# Patient Record
Sex: FEMALE | Race: WHITE | URBAN - METROPOLITAN AREA
[De-identification: names, ages, dates, MRNs, and addresses within clinical notes are randomized per-mention and may not be internally consistent; named-entity substitution may affect disease eponyms.]

---

## 2018-07-10 ENCOUNTER — HOSPITAL ENCOUNTER (OUTPATIENT)
Dept: MAMMOGRAPHY | Age: 53
Discharge: HOME | End: 2018-07-11
Attending: NURSE PRACTITIONER | Admitting: NURSE PRACTITIONER

## 2018-07-10 DIAGNOSIS — Z12.31 VISIT FOR SCREENING MAMMOGRAM: ICD-10-CM

## 2021-08-05 ENCOUNTER — OFFICE VISIT (OUTPATIENT)
Dept: OBGYN | Age: 56
End: 2021-08-05
Payer: MEDICARE

## 2021-08-05 VITALS
BODY MASS INDEX: 30.73 KG/M2 | SYSTOLIC BLOOD PRESSURE: 139 MMHG | HEIGHT: 64 IN | DIASTOLIC BLOOD PRESSURE: 84 MMHG | HEART RATE: 60 BPM | WEIGHT: 180 LBS

## 2021-08-05 DIAGNOSIS — Z01.419 WOMEN'S ANNUAL ROUTINE GYNECOLOGICAL EXAMINATION: Primary | ICD-10-CM

## 2021-08-05 PROCEDURE — 99396 PREV VISIT EST AGE 40-64: CPT | Performed by: OBSTETRICS & GYNECOLOGY

## 2021-08-05 SDOH — ECONOMIC STABILITY: FOOD INSECURITY: WITHIN THE PAST 12 MONTHS, YOU WORRIED THAT YOUR FOOD WOULD RUN OUT BEFORE YOU GOT MONEY TO BUY MORE.: NEVER TRUE

## 2021-08-05 SDOH — ECONOMIC STABILITY: FOOD INSECURITY: WITHIN THE PAST 12 MONTHS, THE FOOD YOU BOUGHT JUST DIDN'T LAST AND YOU DIDN'T HAVE MONEY TO GET MORE.: NEVER TRUE

## 2021-08-05 ASSESSMENT — PATIENT HEALTH QUESTIONNAIRE - PHQ9
SUM OF ALL RESPONSES TO PHQ QUESTIONS 1-9: 0
SUM OF ALL RESPONSES TO PHQ9 QUESTIONS 1 & 2: 0
SUM OF ALL RESPONSES TO PHQ QUESTIONS 1-9: 0
SUM OF ALL RESPONSES TO PHQ QUESTIONS 1-9: 0
2. FEELING DOWN, DEPRESSED OR HOPELESS: 0
1. LITTLE INTEREST OR PLEASURE IN DOING THINGS: 0

## 2021-08-05 ASSESSMENT — SOCIAL DETERMINANTS OF HEALTH (SDOH): HOW HARD IS IT FOR YOU TO PAY FOR THE VERY BASICS LIKE FOOD, HOUSING, MEDICAL CARE, AND HEATING?: NOT HARD AT ALL

## 2021-08-26 ENCOUNTER — NURSE ONLY (OUTPATIENT)
Dept: OBGYN | Age: 56
End: 2021-08-26
Payer: MEDICARE

## 2021-08-26 DIAGNOSIS — N95.1 SYMPTOMATIC MENOPAUSAL OR FEMALE CLIMACTERIC STATES: Primary | ICD-10-CM

## 2021-08-26 LAB — FOLLICLE STIMULATING HORMONE: 91.4 MIU/ML

## 2021-08-26 PROCEDURE — 36415 COLL VENOUS BLD VENIPUNCTURE: CPT | Performed by: OBSTETRICS & GYNECOLOGY

## 2021-09-08 ENCOUNTER — OFFICE VISIT (OUTPATIENT)
Dept: OBGYN | Age: 56
End: 2021-09-08
Payer: MEDICARE

## 2021-09-08 VITALS
DIASTOLIC BLOOD PRESSURE: 84 MMHG | WEIGHT: 181 LBS | SYSTOLIC BLOOD PRESSURE: 145 MMHG | HEIGHT: 64 IN | BODY MASS INDEX: 30.9 KG/M2

## 2021-09-08 DIAGNOSIS — N95.1 SYMPTOMATIC MENOPAUSAL OR FEMALE CLIMACTERIC STATES: Primary | ICD-10-CM

## 2021-09-08 PROCEDURE — 99213 OFFICE O/P EST LOW 20 MIN: CPT | Performed by: OBSTETRICS & GYNECOLOGY

## 2021-09-08 ASSESSMENT — ENCOUNTER SYMPTOMS
GASTROINTESTINAL NEGATIVE: 1
RESPIRATORY NEGATIVE: 1

## 2021-09-08 NOTE — PROGRESS NOTES
21    Marie De Dios  1965    Chief Complaint   Patient presents with    Follow-up     had labs due to menopausal sx. Marie De Dios is a 48 y.o. female who presents today for evaluation of her menopausal range FSH level. She had stopped her birth control much before her lab draw. She is having some symptoms of hot flashes and night sweats. Past Medical History:   Diagnosis Date    Family history of breast cancer     History of elective         Past Surgical History:   Procedure Laterality Date    WISDOM TOOTH EXTRACTION         Social History     Tobacco Use    Smoking status: Never Smoker   Vaping Use    Vaping Use: Never used   Substance Use Topics    Alcohol use: Yes    Drug use: Yes     Types: Marijuana       Family History   Problem Relation Age of Onset    Hypertension Maternal Grandmother     Diabetes Maternal Grandmother     Hypertension Father     Diabetes Father     Hypertension Mother     Breast Cancer Mother     Diabetes Mother     Kidney Cancer Mother        Current Outpatient Medications   Medication Sig Dispense Refill    Norgestim-Eth Estrad Triphasic (TRI-SPRINTEC PO) Take by mouth       No current facility-administered medications for this visit. Allergies   Allergen Reactions    Eggs Or Egg-Derived Products Nausea Only       [id number]    Immunization History   Administered Date(s) Administered    COVID-19, Pfizer, PF, 30mcg/0.3mL 04/15/2021, 2021       Review of Systems   Constitutional: Negative. HENT: Negative. Respiratory: Negative. Cardiovascular: Negative. Gastrointestinal: Negative. Genitourinary: Negative. Psychiatric/Behavioral: Negative for suicidal ideas. The patient is not nervous/anxious. BP (!) 145/84   Ht 5' 4\" (1.626 m)   Wt 181 lb (82.1 kg)   BMI 31.07 kg/m²     Physical Exam    No results found for this visit on 21. ASSESSMENT AND PLAN   Diagnosis Orders   1.  Symptomatic menopausal or female climacteric states     Doing well at this time. Declined any hormone replacement therapy. Will call or return if she changes her mind. Return in about 1 year (around 9/8/2022) for Annual examination.     Olivia Issa MD

## 2021-09-17 ASSESSMENT — ENCOUNTER SYMPTOMS
GASTROINTESTINAL NEGATIVE: 1
RESPIRATORY NEGATIVE: 1

## 2021-09-17 NOTE — PROGRESS NOTES
21    Zelalem Farr  1965    Chief Complaint   Patient presents with    Annual Exam     requesting refill on ocp.  last pap 2020- normal.         The patient is a 48 y.o. female, [id number] who presents for her annual exam.  She is menopausal.  She on ocp. .  She is  sexually active. She reports no gynecological symptoms. Pap smear history: Her last PAP smear was in . Her results were normal.        Past Medical History:   Diagnosis Date    Family history of breast cancer     History of elective         Past Surgical History:   Procedure Laterality Date    WISDOM TOOTH EXTRACTION         Family History   Problem Relation Age of Onset    Hypertension Maternal Grandmother     Diabetes Maternal Grandmother     Hypertension Father     Diabetes Father     Hypertension Mother     Breast Cancer Mother     Diabetes Mother     Kidney Cancer Mother        Social History     Tobacco Use    Smoking status: Never Smoker   Vaping Use    Vaping Use: Never used   Substance Use Topics    Alcohol use: Yes    Drug use: Yes     Types: Marijuana       Current Outpatient Medications   Medication Sig Dispense Refill    Norgestim-Eth Estrad Triphasic (TRI-SPRINTEC PO) Take by mouth       No current facility-administered medications for this visit. Allergies   Allergen Reactions    Eggs Or Egg-Derived Products Nausea Only       [id number]    Immunization History   Administered Date(s) Administered    COVID-19, Pfizer, PF, 30mcg/0.3mL 04/15/2021, 2021       Review of Systems   Constitutional: Negative. HENT: Negative. Respiratory: Negative. Cardiovascular: Negative. Gastrointestinal: Negative. Genitourinary: Negative. Psychiatric/Behavioral: Negative for suicidal ideas. The patient is not nervous/anxious. /84   Pulse 60   Ht 5' 4\" (1.626 m)   Wt 180 lb (81.6 kg)   LMP  (LMP Unknown)   BMI 30.90 kg/m²     Physical Exam  Nursing note reviewed. Exam conducted with a chaperone present. HENT:      Head: Normocephalic. Nose: Nose normal.   Eyes:      Extraocular Movements: Extraocular movements intact. Pulmonary:      Effort: Pulmonary effort is normal.   Abdominal:      General: Abdomen is flat. Palpations: Abdomen is soft. Hernia: There is no hernia in the left inguinal area or right inguinal area. Genitourinary:     General: Normal vulva. Exam position: Lithotomy position. Pubic Area: No rash or pubic lice. Labia:         Right: No rash, tenderness, lesion or injury. Left: No rash, tenderness, lesion or injury. Urethra: No prolapse, urethral pain, urethral swelling or urethral lesion. Vagina: Normal.      Cervix: Normal.      Uterus: Normal.       Adnexa: Right adnexa normal and left adnexa normal.      Rectum: Normal.   Musculoskeletal:      Cervical back: Normal range of motion. Lymphadenopathy:      Lower Body: No right inguinal adenopathy. No left inguinal adenopathy. Skin:     General: Skin is warm. Neurological:      Mental Status: She is alert. No results found for this visit on 08/05/21. Assessment and Plan   Diagnosis Orders   1. Women's annual routine gynecological examination         Return in about 1 year (around 8/5/2022) for Annual examination.     Remi Jimenez MD

## 2024-04-02 SDOH — ECONOMIC STABILITY: FOOD INSECURITY: WITHIN THE PAST 12 MONTHS, YOU WORRIED THAT YOUR FOOD WOULD RUN OUT BEFORE YOU GOT MONEY TO BUY MORE.: NEVER TRUE

## 2024-04-02 SDOH — ECONOMIC STABILITY: FOOD INSECURITY: WITHIN THE PAST 12 MONTHS, THE FOOD YOU BOUGHT JUST DIDN'T LAST AND YOU DIDN'T HAVE MONEY TO GET MORE.: NEVER TRUE

## 2024-04-02 SDOH — ECONOMIC STABILITY: TRANSPORTATION INSECURITY
IN THE PAST 12 MONTHS, HAS LACK OF TRANSPORTATION KEPT YOU FROM MEETINGS, WORK, OR FROM GETTING THINGS NEEDED FOR DAILY LIVING?: NO

## 2024-04-02 SDOH — ECONOMIC STABILITY: HOUSING INSECURITY
IN THE LAST 12 MONTHS, WAS THERE A TIME WHEN YOU DID NOT HAVE A STEADY PLACE TO SLEEP OR SLEPT IN A SHELTER (INCLUDING NOW)?: NO

## 2024-04-02 SDOH — ECONOMIC STABILITY: INCOME INSECURITY: HOW HARD IS IT FOR YOU TO PAY FOR THE VERY BASICS LIKE FOOD, HOUSING, MEDICAL CARE, AND HEATING?: NOT HARD AT ALL

## 2024-04-03 ENCOUNTER — OFFICE VISIT (OUTPATIENT)
Dept: OBGYN | Age: 59
End: 2024-04-03
Payer: COMMERCIAL

## 2024-04-03 ENCOUNTER — TELEPHONE (OUTPATIENT)
Dept: OBGYN | Age: 59
End: 2024-04-03

## 2024-04-03 ENCOUNTER — HOSPITAL ENCOUNTER (OUTPATIENT)
Age: 59
Setting detail: SPECIMEN
Discharge: HOME | End: 2024-04-03
Payer: COMMERCIAL

## 2024-04-03 VITALS
HEIGHT: 64 IN | WEIGHT: 173 LBS | SYSTOLIC BLOOD PRESSURE: 133 MMHG | DIASTOLIC BLOOD PRESSURE: 74 MMHG | BODY MASS INDEX: 29.53 KG/M2

## 2024-04-03 DIAGNOSIS — Z13.820 ENCOUNTER FOR SCREENING FOR OSTEOPOROSIS: ICD-10-CM

## 2024-04-03 DIAGNOSIS — Z01.419 ENCOUNTER FOR WELL WOMAN EXAM WITH ROUTINE GYNECOLOGICAL EXAM: Primary | ICD-10-CM

## 2024-04-03 DIAGNOSIS — Z12.31 SCREENING MAMMOGRAM FOR BREAST CANCER: ICD-10-CM

## 2024-04-03 DIAGNOSIS — Z12.4 CERVICAL CANCER SCREENING: ICD-10-CM

## 2024-04-03 DIAGNOSIS — Z11.51 SCREENING FOR HUMAN PAPILLOMAVIRUS (HPV): ICD-10-CM

## 2024-04-03 DIAGNOSIS — N95.1 MENOPAUSAL SYMPTOMS: ICD-10-CM

## 2024-04-03 PROCEDURE — 99396 PREV VISIT EST AGE 40-64: CPT | Performed by: OBSTETRICS & GYNECOLOGY

## 2024-04-03 PROCEDURE — 88142 CYTOPATH C/V THIN LAYER: CPT

## 2024-04-03 PROCEDURE — 87624 HPV HI-RISK TYP POOLED RSLT: CPT

## 2024-04-03 RX ORDER — ESTROGEN,CON/M-PROGEST ACET 0.625-5 MG
1 TABLET ORAL DAILY
Qty: 28 TABLET | Refills: 3 | Status: SHIPPED | OUTPATIENT
Start: 2024-04-03 | End: 2025-04-03

## 2024-04-03 ASSESSMENT — PATIENT HEALTH QUESTIONNAIRE - PHQ9
SUM OF ALL RESPONSES TO PHQ QUESTIONS 1-9: 0
2. FEELING DOWN, DEPRESSED OR HOPELESS: NOT AT ALL
SUM OF ALL RESPONSES TO PHQ QUESTIONS 1-9: 0
SUM OF ALL RESPONSES TO PHQ QUESTIONS 1-9: 0
SUM OF ALL RESPONSES TO PHQ9 QUESTIONS 1 & 2: 0
SUM OF ALL RESPONSES TO PHQ QUESTIONS 1-9: 0
1. LITTLE INTEREST OR PLEASURE IN DOING THINGS: NOT AT ALL

## 2024-04-03 NOTE — TELEPHONE ENCOUNTER
Pt was seen today. Prempro was called in for the pt. Pt sates it's going to cost $200, pt would like to know if something more affordable can be called in. Please advise.

## 2024-04-03 NOTE — PROGRESS NOTES
4/3/24    Vanda Penaloza  1965    Chief Complaint   Patient presents with    Gynecologic Exam     Pt here for annual exam. Menopausal, no hrt . Pt requesting to discuss due to hot flashes x . Is sexually active.  Last pap smear-  negative, Mammo 2018 negative. Dexa- never. Colonoscopy  never.  Pt c/o aching bones and brain fog. Pt has questions regarding menopause.         Vanda Penaloza is a 58 y.o. female who presents today for evaluation of see above.    Past Medical History:   Diagnosis Date    Brain fog     Family history of breast cancer     History of elective      Hot flashes     Menopausal symptoms        Past Surgical History:   Procedure Laterality Date    WISDOM TOOTH EXTRACTION         Social History     Tobacco Use    Smoking status: Never   Vaping Use    Vaping Use: Never used   Substance Use Topics    Alcohol use: Yes     Comment: rarely    Drug use: Yes     Types: Marijuana (Weed)     Comment: medical       Family History   Problem Relation Age of Onset    Hypertension Maternal Grandmother     Diabetes Maternal Grandmother     Hypertension Father     Diabetes Father     Hypertension Mother     Breast Cancer Mother     Diabetes Mother     Kidney Cancer Mother        Current Outpatient Medications   Medication Sig Dispense Refill    estrogen, conjugated,-medroxyPROGESTERone (PREMPRO) 0.625-5 MG per tablet Take 1 tablet by mouth daily 28 tablet 3     No current facility-administered medications for this visit.       Allergies   Allergen Reactions    Egg-Derived Products Nausea Only           Immunization History   Administered Date(s) Administered    COVID-19, PFIZER PURPLE top, DILUTE for use, (age 12 y+), 30mcg/0.3mL 04/15/2021, 2021       Review of Systems  All other systems reviewed and are negative    /74 (Site: Right Upper Arm, Position: Sitting, Cuff Size: Medium Adult)   Ht 1.626 m (5' 4\")   Wt 78.5 kg (173 lb)   BMI 29.70 kg/m²     Physical

## 2024-04-05 RX ORDER — ESTRADIOL 1 MG/1
1 TABLET ORAL DAILY
Qty: 90 TABLET | Refills: 3 | Status: SHIPPED | OUTPATIENT
Start: 2024-04-05

## 2024-04-05 RX ORDER — MEDROXYPROGESTERONE ACETATE 5 MG/1
5 TABLET ORAL DAILY
Qty: 90 EACH | Refills: 3 | Status: SHIPPED | OUTPATIENT
Start: 2024-04-05

## 2024-04-06 LAB — HPV HIGH RISK: NOT DETECTED

## 2024-06-13 ENCOUNTER — TELEPHONE (OUTPATIENT)
Dept: OBGYN | Age: 59
End: 2024-06-13

## 2024-06-13 NOTE — TELEPHONE ENCOUNTER
Pt states since starting her Estrace and Provera she has had worsening hot flashes. What do you recommend?

## 2024-11-08 ENCOUNTER — OFFICE VISIT (OUTPATIENT)
Dept: FAMILY MEDICINE CLINIC | Age: 59
End: 2024-11-08
Payer: COMMERCIAL

## 2024-11-08 VITALS
TEMPERATURE: 98.1 F | OXYGEN SATURATION: 97 % | BODY MASS INDEX: 29.7 KG/M2 | WEIGHT: 173 LBS | DIASTOLIC BLOOD PRESSURE: 74 MMHG | HEART RATE: 69 BPM | SYSTOLIC BLOOD PRESSURE: 128 MMHG

## 2024-11-08 DIAGNOSIS — K04.7 TOOTH INFECTION: Primary | ICD-10-CM

## 2024-11-08 PROCEDURE — 1036F TOBACCO NON-USER: CPT | Performed by: NURSE PRACTITIONER

## 2024-11-08 PROCEDURE — 3017F COLORECTAL CA SCREEN DOC REV: CPT | Performed by: NURSE PRACTITIONER

## 2024-11-08 PROCEDURE — G8419 CALC BMI OUT NRM PARAM NOF/U: HCPCS | Performed by: NURSE PRACTITIONER

## 2024-11-08 PROCEDURE — 99202 OFFICE O/P NEW SF 15 MIN: CPT | Performed by: NURSE PRACTITIONER

## 2024-11-08 PROCEDURE — G8484 FLU IMMUNIZE NO ADMIN: HCPCS | Performed by: NURSE PRACTITIONER

## 2024-11-08 PROCEDURE — G8427 DOCREV CUR MEDS BY ELIG CLIN: HCPCS | Performed by: NURSE PRACTITIONER

## 2024-11-08 NOTE — PROGRESS NOTES
visit:    Tooth infection  -     amoxicillin-clavulanate (AUGMENTIN) 875-125 MG per tablet; Take 1 tablet by mouth 2 times daily for 10 days    Appears to be small abscess @ gumline of left upper canine. Cannot get into dentist until 11/19. Will start augmentin today.     Discussed the following with Vanda:    Start augmentin to cover for infection (tooth).    Antibiotics disrupt the gut microflora, which can have negative implications now (abdominal pain, diarrhea, nausea, vomiting, yeast overgrowth).    Recommend yogurt (or kefir) and probiotic (look for lactobacillus strain) for the duration of antibiotic therapy and several months following cessation. Goal is to replenish the beneficial bacteria in the gut that was lost during treatment with antibiotics.     Ibuprofen for pain - 2 tabs every 4 hrs as needed OR 3 tabs every 6 hrs as needed OR 4 tabs every 8 hrs as needed.     Follow-up if no improvement in symptoms in 3 days or for worsening symptoms.     Dental appt on 11/19.     Vanda indicates understanding and is agreeable to plan.      After visit summary provided.      Return if symptoms worsen or fail to improve.     Current Outpatient Medications   Medication Sig Dispense Refill    amoxicillin-clavulanate (AUGMENTIN) 875-125 MG per tablet Take 1 tablet by mouth 2 times daily for 10 days 20 tablet 0    estradiol (ESTRACE) 1 MG tablet Take 1 tablet by mouth daily 90 tablet 3     No current facility-administered medications for this visit.       Patient Instructions   Start augmentin to cover for infection (tooth).    Antibiotics disrupt the gut microflora, which can have negative implications now (abdominal pain, diarrhea, nausea, vomiting, yeast overgrowth).    Recommend yogurt (or kefir) and probiotic (look for lactobacillus strain) for the duration of antibiotic therapy and several months following cessation. Goal is to replenish the beneficial bacteria in the gut that was lost during treatment

## 2024-11-08 NOTE — PATIENT INSTRUCTIONS
Start augmentin to cover for infection (tooth).    Antibiotics disrupt the gut microflora, which can have negative implications now (abdominal pain, diarrhea, nausea, vomiting, yeast overgrowth).    Recommend yogurt (or kefir) and probiotic (look for lactobacillus strain) for the duration of antibiotic therapy and several months following cessation. Goal is to replenish the beneficial bacteria in the gut that was lost during treatment with antibiotics.     Ibuprofen for pain - 2 tabs every 4 hrs as needed OR 3 tabs every 6 hrs as needed OR 4 tabs every 8 hrs as needed.     Follow-up if no improvement in symptoms in 3 days or for worsening symptoms.     Dental appt on 11/19.

## 2025-03-10 RX ORDER — ESTRADIOL 1 MG/1
1 TABLET ORAL DAILY
Qty: 90 TABLET | Refills: 3 | OUTPATIENT
Start: 2025-03-10